# Patient Record
Sex: FEMALE | Race: OTHER
[De-identification: names, ages, dates, MRNs, and addresses within clinical notes are randomized per-mention and may not be internally consistent; named-entity substitution may affect disease eponyms.]

---

## 2018-12-29 ENCOUNTER — HOSPITAL ENCOUNTER (INPATIENT)
Dept: HOSPITAL 15 - ER | Age: 53
LOS: 2 days | Discharge: HOME | DRG: 463 | End: 2018-12-31
Attending: INTERNAL MEDICINE | Admitting: INTERNAL MEDICINE
Payer: MEDICAID

## 2018-12-29 VITALS — DIASTOLIC BLOOD PRESSURE: 81 MMHG | SYSTOLIC BLOOD PRESSURE: 148 MMHG

## 2018-12-29 VITALS — DIASTOLIC BLOOD PRESSURE: 85 MMHG | SYSTOLIC BLOOD PRESSURE: 156 MMHG

## 2018-12-29 VITALS — WEIGHT: 227.08 LBS | HEIGHT: 62 IN | BODY MASS INDEX: 41.79 KG/M2

## 2018-12-29 DIAGNOSIS — K42.9: ICD-10-CM

## 2018-12-29 DIAGNOSIS — N13.6: Primary | ICD-10-CM

## 2018-12-29 DIAGNOSIS — Z83.3: ICD-10-CM

## 2018-12-29 DIAGNOSIS — E11.21: ICD-10-CM

## 2018-12-29 DIAGNOSIS — K76.0: ICD-10-CM

## 2018-12-29 DIAGNOSIS — E66.9: ICD-10-CM

## 2018-12-29 DIAGNOSIS — N18.2: ICD-10-CM

## 2018-12-29 DIAGNOSIS — E61.1: ICD-10-CM

## 2018-12-29 DIAGNOSIS — Z90.49: ICD-10-CM

## 2018-12-29 DIAGNOSIS — E11.22: ICD-10-CM

## 2018-12-29 DIAGNOSIS — I12.9: ICD-10-CM

## 2018-12-29 DIAGNOSIS — K57.30: ICD-10-CM

## 2018-12-29 LAB
ALBUMIN SERPL-MCNC: 3.6 G/DL (ref 3.4–5)
ALP SERPL-CCNC: 68 U/L (ref 45–117)
ALT SERPL-CCNC: 74 U/L (ref 13–56)
AMYLASE SERPL-CCNC: 65 U/L (ref 25–115)
ANION GAP SERPL CALCULATED.3IONS-SCNC: 13 MMOL/L (ref 5–15)
BILIRUB SERPL-MCNC: 0.2 MG/DL (ref 0.2–1)
BUN SERPL-MCNC: 21 MG/DL (ref 7–18)
BUN/CREAT SERPL: 20.8
CALCIUM SERPL-MCNC: 9 MG/DL (ref 8.5–10.1)
CHLORIDE SERPL-SCNC: 104 MMOL/L (ref 98–107)
CO2 SERPL-SCNC: 21 MMOL/L (ref 21–32)
GLUCOSE SERPL-MCNC: 190 MG/DL (ref 74–106)
HCT VFR BLD AUTO: 36.4 % (ref 36–46)
HGB BLD-MCNC: 11.6 G/DL (ref 12.2–16.2)
LIPASE SERPL-CCNC: 199 U/L (ref 73–393)
MCH RBC QN AUTO: 23.7 PG (ref 28–32)
MCV RBC AUTO: 74.5 FL (ref 80–100)
NRBC BLD QL AUTO: 0 %
POTASSIUM SERPL-SCNC: 3.5 MMOL/L (ref 3.5–5.1)
PROT SERPL-MCNC: 7.5 G/DL (ref 6.4–8.2)
SODIUM SERPL-SCNC: 138 MMOL/L (ref 136–145)

## 2018-12-29 PROCEDURE — 82150 ASSAY OF AMYLASE: CPT

## 2018-12-29 PROCEDURE — 96374 THER/PROPH/DIAG INJ IV PUSH: CPT

## 2018-12-29 PROCEDURE — 96375 TX/PRO/DX INJ NEW DRUG ADDON: CPT

## 2018-12-29 PROCEDURE — 80053 COMPREHEN METABOLIC PANEL: CPT

## 2018-12-29 PROCEDURE — 83036 HEMOGLOBIN GLYCOSYLATED A1C: CPT

## 2018-12-29 PROCEDURE — 85610 PROTHROMBIN TIME: CPT

## 2018-12-29 PROCEDURE — 83690 ASSAY OF LIPASE: CPT

## 2018-12-29 PROCEDURE — 82962 GLUCOSE BLOOD TEST: CPT

## 2018-12-29 PROCEDURE — 83735 ASSAY OF MAGNESIUM: CPT

## 2018-12-29 PROCEDURE — 74018 RADEX ABDOMEN 1 VIEW: CPT

## 2018-12-29 PROCEDURE — 36415 COLL VENOUS BLD VENIPUNCTURE: CPT

## 2018-12-29 PROCEDURE — 81001 URINALYSIS AUTO W/SCOPE: CPT

## 2018-12-29 PROCEDURE — 83540 ASSAY OF IRON: CPT

## 2018-12-29 PROCEDURE — 96361 HYDRATE IV INFUSION ADD-ON: CPT

## 2018-12-29 PROCEDURE — 74176 CT ABD & PELVIS W/O CONTRAST: CPT

## 2018-12-29 PROCEDURE — 84443 ASSAY THYROID STIM HORMONE: CPT

## 2018-12-29 PROCEDURE — 84484 ASSAY OF TROPONIN QUANT: CPT

## 2018-12-29 PROCEDURE — 85025 COMPLETE CBC W/AUTO DIFF WBC: CPT

## 2018-12-29 RX ADMIN — HUMAN INSULIN SCH UNITS: 100 INJECTION, SOLUTION SUBCUTANEOUS at 17:00

## 2018-12-29 RX ADMIN — SODIUM CHLORIDE SCH MLS/HR: 0.9 INJECTION, SOLUTION INTRAVENOUS at 13:15

## 2018-12-29 RX ADMIN — HUMAN INSULIN SCH UNITS: 100 INJECTION, SOLUTION SUBCUTANEOUS at 22:00

## 2018-12-29 RX ADMIN — SODIUM CHLORIDE SCH MLS/HR: 0.9 INJECTION, SOLUTION INTRAVENOUS at 22:53

## 2018-12-29 RX ADMIN — Medication SCH STRIP: at 23:06

## 2018-12-29 RX ADMIN — Medication SCH STRIP: at 17:22

## 2018-12-29 RX ADMIN — HYDROCODONE BITARTRATE AND ACETAMINOPHEN PRN TAB: 5; 325 TABLET ORAL at 17:37

## 2018-12-29 RX ADMIN — ACETAMINOPHEN PRN MG: 325 TABLET ORAL at 23:04

## 2018-12-29 NOTE — NUR
open shift note

Patient alert and oriented x4, 0/10 of pain. 2L nasal cannula, does not use oxygen at 
baseline. Patient can ambulate with stand by assistance for safety. Bed is locked in lowest 
position, with side rails up x2 and call light within reach. POC discussed and questions 
answered. Will continue to round Q1hr and PRN.

## 2018-12-29 NOTE — NUR
End of Shift Note

Patient sitting up in bed, no s/s of distress/ SOB or pain stated or noted. Bed at lowest 
position and call light within reach. Care endorsed to NOC RN.

## 2018-12-29 NOTE — NUR
Telemetry admit from [facility] admitted to Telemetry unit after SBAR received.  Patient oriented to Jodie Baca, primary RN, unit, room, bed, and unit policies regarding patient care and visiting 
hours. Patient now on continuous telemetry monitoring, tele box # 8 and telemetry reading on 
arrival to unit is NSR . Patient encouraged to call if they need something. All questions 
and concerns addressed, patient verbalized understanding. Family at bedside.

## 2018-12-30 VITALS — DIASTOLIC BLOOD PRESSURE: 73 MMHG | SYSTOLIC BLOOD PRESSURE: 126 MMHG

## 2018-12-30 VITALS — DIASTOLIC BLOOD PRESSURE: 71 MMHG | SYSTOLIC BLOOD PRESSURE: 123 MMHG

## 2018-12-30 VITALS — SYSTOLIC BLOOD PRESSURE: 109 MMHG | DIASTOLIC BLOOD PRESSURE: 69 MMHG

## 2018-12-30 VITALS — DIASTOLIC BLOOD PRESSURE: 70 MMHG | SYSTOLIC BLOOD PRESSURE: 138 MMHG

## 2018-12-30 VITALS — SYSTOLIC BLOOD PRESSURE: 116 MMHG | DIASTOLIC BLOOD PRESSURE: 66 MMHG

## 2018-12-30 VITALS — SYSTOLIC BLOOD PRESSURE: 135 MMHG | DIASTOLIC BLOOD PRESSURE: 78 MMHG

## 2018-12-30 LAB
ALBUMIN SERPL-MCNC: 3.2 G/DL (ref 3.4–5)
ALP SERPL-CCNC: 84 U/L (ref 45–117)
ALT SERPL-CCNC: 95 U/L (ref 13–56)
ANION GAP SERPL CALCULATED.3IONS-SCNC: 4 MMOL/L (ref 5–15)
BILIRUB SERPL-MCNC: 0.4 MG/DL (ref 0.2–1)
BUN SERPL-MCNC: 9 MG/DL (ref 7–18)
BUN/CREAT SERPL: 16.7
CALCIUM SERPL-MCNC: 8.2 MG/DL (ref 8.5–10.1)
CHLORIDE SERPL-SCNC: 107 MMOL/L (ref 98–107)
CO2 SERPL-SCNC: 28 MMOL/L (ref 21–32)
GLUCOSE SERPL-MCNC: 143 MG/DL (ref 74–106)
HCT VFR BLD AUTO: 35 % (ref 36–46)
HGB BLD-MCNC: 11.3 G/DL (ref 12.2–16.2)
INR PPP: 0.93 (ref 0.9–1.15)
MCH RBC QN AUTO: 24.3 PG (ref 28–32)
MCV RBC AUTO: 75.5 FL (ref 80–100)
NRBC BLD QL AUTO: 0.1 %
POTASSIUM SERPL-SCNC: 3.4 MMOL/L (ref 3.5–5.1)
PROT SERPL-MCNC: 6.8 G/DL (ref 6.4–8.2)
PROTHROMBIN TIME: 10 SEC (ref 9.27–12.13)
SODIUM SERPL-SCNC: 139 MMOL/L (ref 136–145)

## 2018-12-30 RX ADMIN — CEFTRIAXONE SODIUM SCH MLS/HR: 1 INJECTION, POWDER, FOR SOLUTION INTRAMUSCULAR; INTRAVENOUS at 09:00

## 2018-12-30 RX ADMIN — HYDROCODONE BITARTRATE AND ACETAMINOPHEN PRN TAB: 5; 325 TABLET ORAL at 14:43

## 2018-12-30 RX ADMIN — Medication SCH STRIP: at 22:19

## 2018-12-30 RX ADMIN — SODIUM CHLORIDE SCH MLS/HR: 0.9 INJECTION, SOLUTION INTRAVENOUS at 20:03

## 2018-12-30 RX ADMIN — MORPHINE SULFATE PRN MG: 4 INJECTION, SOLUTION INTRAMUSCULAR; INTRAVENOUS at 20:25

## 2018-12-30 RX ADMIN — HUMAN INSULIN SCH UNITS: 100 INJECTION, SOLUTION SUBCUTANEOUS at 11:30

## 2018-12-30 RX ADMIN — MULTIVITAMIN TABLET SCH TAB: TABLET at 10:28

## 2018-12-30 RX ADMIN — ONDANSETRON HYDROCHLORIDE PRN MG: 2 INJECTION, SOLUTION INTRAMUSCULAR; INTRAVENOUS at 10:55

## 2018-12-30 RX ADMIN — HYDROCODONE BITARTRATE AND ACETAMINOPHEN PRN TAB: 5; 325 TABLET ORAL at 01:53

## 2018-12-30 RX ADMIN — HUMAN INSULIN SCH UNITS: 100 INJECTION, SOLUTION SUBCUTANEOUS at 22:18

## 2018-12-30 RX ADMIN — Medication SCH STRIP: at 06:54

## 2018-12-30 RX ADMIN — Medication SCH STRIP: at 11:30

## 2018-12-30 RX ADMIN — HUMAN INSULIN SCH UNITS: 100 INJECTION, SOLUTION SUBCUTANEOUS at 06:55

## 2018-12-30 RX ADMIN — ONDANSETRON HYDROCHLORIDE PRN MG: 2 INJECTION, SOLUTION INTRAMUSCULAR; INTRAVENOUS at 20:26

## 2018-12-30 RX ADMIN — SODIUM CHLORIDE SCH MLS/HR: 0.9 INJECTION, SOLUTION INTRAVENOUS at 14:06

## 2018-12-30 RX ADMIN — HUMAN INSULIN SCH UNITS: 100 INJECTION, SOLUTION SUBCUTANEOUS at 17:00

## 2018-12-30 RX ADMIN — Medication SCH STRIP: at 17:00

## 2018-12-30 RX ADMIN — SODIUM CHLORIDE SCH MLS/HR: 0.9 INJECTION, SOLUTION INTRAVENOUS at 05:02

## 2018-12-30 NOTE — NUR
Open shift note

Patient is alert and oriented x4, on room air, 20 gauge in left hand is intact and patent. 
Patient requested a bedside commode. Patient states 5/10 of pain at this time. Family is at 
bedside, POC discussed and questions answered. Bed is locked in lowest position with side 
rails up x2 for safety. Call light is within reach. Will continue to round Q1hr and PRN.

## 2018-12-30 NOTE — NUR
End of shift note 

Patient resting comfortably, no s/s of distress noted, no c/o pain. Care endorsed to NOC RN.

## 2018-12-30 NOTE — NUR
Opening Shift Note

Assumed care of patient, patient comfortably resting in bed, asleep./Respirations even and 
unlabored.  No S/S of distress/SOB or pain. Bed at lowest position and call light within 
reach. Will continue to monitor for changes Q1hr and PRN.

## 2018-12-31 VITALS — SYSTOLIC BLOOD PRESSURE: 126 MMHG | DIASTOLIC BLOOD PRESSURE: 56 MMHG

## 2018-12-31 VITALS — DIASTOLIC BLOOD PRESSURE: 85 MMHG | SYSTOLIC BLOOD PRESSURE: 161 MMHG

## 2018-12-31 VITALS — DIASTOLIC BLOOD PRESSURE: 49 MMHG | SYSTOLIC BLOOD PRESSURE: 111 MMHG

## 2018-12-31 VITALS — DIASTOLIC BLOOD PRESSURE: 74 MMHG | SYSTOLIC BLOOD PRESSURE: 120 MMHG

## 2018-12-31 LAB
ALBUMIN SERPL-MCNC: 3 G/DL (ref 3.4–5)
ALP SERPL-CCNC: 82 U/L (ref 45–117)
ALT SERPL-CCNC: 90 U/L (ref 13–56)
ANION GAP SERPL CALCULATED.3IONS-SCNC: 6 MMOL/L (ref 5–15)
BILIRUB SERPL-MCNC: 0.2 MG/DL (ref 0.2–1)
BUN SERPL-MCNC: 7 MG/DL (ref 7–18)
BUN/CREAT SERPL: 14.3
CALCIUM SERPL-MCNC: 8.3 MG/DL (ref 8.5–10.1)
CHLORIDE SERPL-SCNC: 108 MMOL/L (ref 98–107)
CO2 SERPL-SCNC: 27 MMOL/L (ref 21–32)
GLUCOSE SERPL-MCNC: 129 MG/DL (ref 74–106)
HCT VFR BLD AUTO: 34.6 % (ref 36–46)
HGB BLD-MCNC: 11 G/DL (ref 12.2–16.2)
MAGNESIUM SERPL-MCNC: 2.2 MG/DL (ref 1.6–2.6)
MCH RBC QN AUTO: 24.3 PG (ref 28–32)
MCV RBC AUTO: 76.5 FL (ref 80–100)
NRBC BLD QL AUTO: 0.1 %
POTASSIUM SERPL-SCNC: 3.9 MMOL/L (ref 3.5–5.1)
PROT SERPL-MCNC: 6.5 G/DL (ref 6.4–8.2)
SODIUM SERPL-SCNC: 141 MMOL/L (ref 136–145)

## 2018-12-31 RX ADMIN — SODIUM CHLORIDE SCH MLS/HR: 0.9 INJECTION, SOLUTION INTRAVENOUS at 06:25

## 2018-12-31 RX ADMIN — ACETAMINOPHEN PRN MG: 325 TABLET ORAL at 11:38

## 2018-12-31 RX ADMIN — Medication SCH STRIP: at 06:25

## 2018-12-31 RX ADMIN — CEFTRIAXONE SODIUM SCH MLS/HR: 1 INJECTION, POWDER, FOR SOLUTION INTRAMUSCULAR; INTRAVENOUS at 10:45

## 2018-12-31 RX ADMIN — HUMAN INSULIN SCH UNITS: 100 INJECTION, SOLUTION SUBCUTANEOUS at 11:39

## 2018-12-31 RX ADMIN — MULTIVITAMIN TABLET SCH TAB: TABLET at 10:49

## 2018-12-31 RX ADMIN — Medication SCH STRIP: at 11:38

## 2018-12-31 RX ADMIN — SODIUM CHLORIDE SCH MLS/HR: 0.9 INJECTION, SOLUTION INTRAVENOUS at 15:06

## 2018-12-31 RX ADMIN — MORPHINE SULFATE PRN MG: 4 INJECTION, SOLUTION INTRAMUSCULAR; INTRAVENOUS at 06:37

## 2018-12-31 RX ADMIN — HUMAN INSULIN SCH UNITS: 100 INJECTION, SOLUTION SUBCUTANEOUS at 06:25

## 2018-12-31 NOTE — NUR
SPOKE WITH MD VIDAL

PER MD, PATIENT SHOULD BE DISCHARGED ON ORAL PAIN MEDICATION, NO INTERVENTION FROM UROLOGY 
AT THIS TIME.

## 2018-12-31 NOTE — NUR
Discharge instructions given as ordered. Encourage to follow up with PMD as instructed. All 
questions and concerns addressed. Patient verbalized understanding.  Medication 
reconciliation form completed and copy given to patient. Home medications held in Pharmacy 
returned to patient. IV removed with catheter intact, pressure dressing applied. Telemetry 
unit returned to ICU. Patient taken to vehicle via wheelchair with all personal belongings, 
accompanied by staff and family member. No distress noted at time of departure.